# Patient Record
(demographics unavailable — no encounter records)

---

## 2025-07-16 NOTE — ASSESSMENT
[FreeTextEntry1] : 15 M with  B/l L4 lysis pain improved . Played 2 seasons now with recurrent pain after playing football.   MRI L spine to rule our recurrent lysis FU after MRI

## 2025-07-16 NOTE — HISTORY OF PRESENT ILLNESS
[Lower back] : lower back [de-identified] : 07/16/2025: PT is here to F/U with lower back pain. He states that he has been doing okay since last visit. Had been doing physical therapy last year, for 6 weeks, which helped. Afterwards, he has been wearing a back brace and got into football and basketball season. During football workouts, and running routes, the pain has come back.  07/24/2024: 15 year old male presents today with complaints of low back pain that developed last year. He reports that symptoms worsen with sports activity such as running and playing football. He denies radicular garret, n/t.  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

## 2025-07-16 NOTE — DATA REVIEWED
[MRI] : MRI [Lumbar Spine] : lumbar spine [I independently reviewed and interpreted images and report] : I independently reviewed and interpreted images and report [FreeTextEntry1] : B/L L4 lysis